# Patient Record
Sex: FEMALE | Race: WHITE
[De-identification: names, ages, dates, MRNs, and addresses within clinical notes are randomized per-mention and may not be internally consistent; named-entity substitution may affect disease eponyms.]

---

## 2017-07-29 ENCOUNTER — HEALTH MAINTENANCE LETTER (OUTPATIENT)
Age: 22
End: 2017-07-29

## 2019-06-11 ENCOUNTER — OFFICE VISIT (OUTPATIENT)
Dept: FAMILY MEDICINE | Facility: CLINIC | Age: 24
End: 2019-06-11
Payer: COMMERCIAL

## 2019-06-11 VITALS
TEMPERATURE: 99.3 F | WEIGHT: 227 LBS | DIASTOLIC BLOOD PRESSURE: 72 MMHG | OXYGEN SATURATION: 97 % | HEIGHT: 62 IN | BODY MASS INDEX: 41.77 KG/M2 | SYSTOLIC BLOOD PRESSURE: 113 MMHG | HEART RATE: 65 BPM

## 2019-06-11 DIAGNOSIS — Z00.00 HEALTHCARE MAINTENANCE: ICD-10-CM

## 2019-06-11 DIAGNOSIS — Z23 NEED FOR VACCINATION: Primary | ICD-10-CM

## 2019-06-11 PROCEDURE — 90471 IMMUNIZATION ADMIN: CPT | Performed by: NURSE PRACTITIONER

## 2019-06-11 PROCEDURE — 90714 TD VACC NO PRESV 7 YRS+ IM: CPT | Performed by: NURSE PRACTITIONER

## 2019-06-11 PROCEDURE — 99203 OFFICE O/P NEW LOW 30 MIN: CPT | Mod: 25 | Performed by: NURSE PRACTITIONER

## 2019-06-11 PROCEDURE — 86765 RUBEOLA ANTIBODY: CPT | Performed by: NURSE PRACTITIONER

## 2019-06-11 PROCEDURE — 86762 RUBELLA ANTIBODY: CPT | Performed by: NURSE PRACTITIONER

## 2019-06-11 PROCEDURE — 36415 COLL VENOUS BLD VENIPUNCTURE: CPT | Performed by: NURSE PRACTITIONER

## 2019-06-11 PROCEDURE — 86735 MUMPS ANTIBODY: CPT | Performed by: NURSE PRACTITIONER

## 2019-06-11 PROCEDURE — 86580 TB INTRADERMAL TEST: CPT | Performed by: NURSE PRACTITIONER

## 2019-06-11 RX ORDER — SERTRALINE HYDROCHLORIDE 100 MG/1
100 TABLET, FILM COATED ORAL DAILY
COMMUNITY

## 2019-06-11 ASSESSMENT — MIFFLIN-ST. JEOR: SCORE: 1737.92

## 2019-06-11 ASSESSMENT — PAIN SCALES - GENERAL: PAINLEVEL: NO PAIN (0)

## 2019-06-11 NOTE — LETTER
[facility]  [address] [place]  [address]  [phone]        June 13, 2019    Amaris Barnhart  [address]  [facility] [address]        Dear Amaris,    The results of your recent labs are enclosed.   Your blood work shows you have a positive antibody response to Rubella, Rubeola and Mumps through previous vaccination. No additional vaccination (booster) is needed.   Please call the clinic if you have any concerns.     Results for orders placed or performed in visit on 06/11/19   Rubeola Antibody IgG   Result Value Ref Range    Rubeola (Measles) Antibody IgG 2.8 (H) 0.0 - 0.8 AI   Mumps Antibody IgG   Result Value Ref Range    Mumps Antibody IgG 5.5 (H) 0.0 - 0.8 AI   Rubella Antibody IgG Quantitative   Result Value Ref Range    Rubella Antibody IgG Quantitative 10 IU/mL       If you have any questions please call the clinic at [phone].    Sincerely,    Mariam WEBB CNP  LMD

## 2019-06-11 NOTE — PROGRESS NOTES
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    We reviewed previous immunization history  Last Tdap 2007  She has completed vaccination series for Hep A, B, Menactra, HPV, Polio  She believes to have completed course of MMR but does not have actual immunization date  She was previously living in [place], and [place] prior that that    She is reviewing forms online and states that she needs immunization for Meningococcal B, pneumonia, 4th polio        Patient Active Problem List   Diagnosis     Obesity     Elevated serum creatinine     Past Surgical History:   Procedure Laterality Date     C ANESTH,CLEFT LIP REPAIR         Social History     Tobacco Use     Smoking status: Never Smoker     Smokeless tobacco: Never Used     Tobacco comment: no exposure in home   Substance Use Topics     Alcohol use: No     Family History   Problem Relation Age of Onset     Hypertension Maternal Grandfather      Heart Disease Maternal Grandfather      Hypertension Paternal Grandfather          Current Outpatient Medications   Medication Sig Dispense Refill     sertraline (ZOLOFT) 100 MG tablet Take 100 mg by mouth daily       acetaminophen (TYLENOL) 325 MG tablet Take 1-2 tablets by mouth every 6 hours as needed.           Reviewed and updated as needed this visit by Provider         Review of Systems   ROS COMP: Constitutional, HEENT, cardiovascular, pulmonary, gi and gu systems are negative, except as otherwise noted.      Objective    /72 (BP Location: Right arm, Patient Position: Chair, Cuff Size: Adult Large)   Pulse 65   Temp 99.3  F (37.4  C) (Oral)   Ht 1.575 m (5' 2\")   Wt 103 kg (227 lb)   SpO2 97%   BMI 41.52 kg/m    Body mass index " "is 41.52 kg/m .  Physical Exam   GENERAL: healthy, alert and no distress  PSYCH: mentation appears normal, affect normal/bright    Diagnostic Test Results:  None        Assessment & Plan       ICD-10-CM    1. Need for vaccination Z23 Rubeola Antibody IgG     Mumps Antibody IgG     Rubella Antibody IgG Quantitative     TB INTRADERMAL TEST     TD PRESERV FREE, IM (7+ YRS)     IPV, IM/SUBQ (6+ WKS)   2. Healthcare maintenance Z00.00 Rubeola Antibody IgG     Mumps Antibody IgG     Rubella Antibody IgG Quantitative     TB INTRADERMAL TEST     TD PRESERV FREE, IM (7+ YRS)     IPV, IM/SUBQ (6+ WKS)        BMI:   Estimated body mass index is 41.52 kg/m  as calculated from the following:    Height as of this encounter: 1.575 m (5' 2\").    Weight as of this encounter: 103 kg (227 lb).   Not discussed    Will check titers for MMR  Mantoux and tetanus booster   I am not sure why she would need a 4th Polio vaccine, pneumonia vaccination or Meningococcal B  I reviewed CDC's recommendations on these vaccinations and she is not high risk requiring any of these vaccinations  I recommend reviewing College's recommendations to determine whether these are necessary as in my opinion they are not      JON Jenkins [facility]      "

## 2019-06-11 NOTE — NURSING NOTE
The patient is asked the following questions today and these are her answers:    -Have you had a mantoux administered in the past 30 days?    No  -Have you had a previous positive Mantoux.  No  -Have you received BCG in the past.  No  -Have you had a live vaccine  (MMR, Varicella, OPV, Yellow Fever) in the last 6 weeks.  No  -Have you had and active  viral or bacterial infection in the past 6 weeks.  No  -Have you received corticosteroids or immunosuppressive agents in the past 6 weeks.  No  -Have you been diagnosed with HIV?  No  -Do you have a maglinancy?  No   Mantoux given to patient today    Time of administration 406 PM     Date of administration 6/11/19     Given in right forearm forearm.     Patient advised to return 48- 72 hours for reading.     Sign  YANN Grace MA    Screening Questionnaire for Adult Immunization    1. Are you sick today? No  2. Do  you have allergies to medications, food, a vaccine component, or latex? No   3. Have you had a serious reaction to a vaccine in the past? No   4. Do you have a long-term  health problem with lung, heart, kidney or metabolic disease(e.g., diabetes), asthma, or a blood disorder?  No  5.Do you have cancer,leukemia,HIV/AIDS, or any other immune system problem ? No  6. In the past 3 months, have you taken medications that weaken your immune sytem,      Such as cortisone, prednisone, other steroids, or anticancer drugs, or have you had radiation treatments ?No  7. Have you had a seizure or brain or other nervous system problems? No  8. During the past year, have you received a transfusion of blood or blood products, or been given immune      (gamma) globulin or an antiviral drug? No  9. For women: Are you pregnant or is there a chance you could become pregnant during the next month?No   10. Have you received any vaccinations in the past 4 weeks ? No    Immunization questionnaire answers were all negative.     Screening performed by Patient/CM  VIS for Td given on  same date of administration.  Staff signature/Title: YANN Grace MA  Prior to injection, verified patient identity using patient's name and date of birth.  Due to injection administration, patient instructed to remain in clinic for 15 minutes  afterwards, and to report any adverse reaction to me immediately.    Td    Drug Amount Wasted:  None.  Vial/Syringe: Syringe  Expiration Date:

## 2019-06-13 ENCOUNTER — ALLIED HEALTH/NURSE VISIT (OUTPATIENT)
Dept: NURSING | Facility: CLINIC | Age: 24
End: 2019-06-13
Payer: COMMERCIAL

## 2019-06-13 DIAGNOSIS — Z11.1 SCREENING EXAMINATION FOR PULMONARY TUBERCULOSIS: Primary | ICD-10-CM

## 2019-06-13 LAB
MEV IGG SER QL IA: 2.8 AI (ref 0–0.8)
MUV IGG SER QL IA: 5.5 AI (ref 0–0.8)
PPDINDURATION: 0 MM (ref 0–5)
PPDREDNESS: 0 MM
RUBV IGG SERPL IA-ACNC: 10 IU/ML

## 2019-06-13 PROCEDURE — 99207 ZZC NO CHARGE NURSE ONLY: CPT

## 2019-06-13 NOTE — NURSING NOTE
Mantoux result:  Lab Results   Component Value Date    PPDREDNESS 0 06/13/2019    PPDINDURATIO 0 06/13/2019     Is induration greater than 5mm?  No     Mantoux results: Negative  No induration.  No swelling.  No redness.    Nadja Castro RN  [facility]

## 2019-06-13 NOTE — RESULT ENCOUNTER NOTE
[address] [address]  Phone: [phone]  Fax: [phone]      06/13/19    Amaris Barnhart  [address] [place] [id number]      Dear Amaris,    The results of your recent labs are enclosed.  Your blood work shows you have a positive antibody response to Rubella, Rubeola and Mumps through previous vaccination. No additional vaccination (booster) is needed.   Please call the clinic if you have any concerns.    Sincerely,    JON Jenkins CNP    Your [facility] Care Team

## 2019-08-13 ENCOUNTER — TELEPHONE (OUTPATIENT)
Dept: FAMILY MEDICINE | Facility: CLINIC | Age: 24
End: 2019-08-13

## 2019-08-13 NOTE — TELEPHONE ENCOUNTER
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    Type of outreach:    See above message.    Questions for provider review:    None                                                                                                                                    YANN Grace MA       Chart routed to Care Team .

## 2020-05-06 ENCOUNTER — OFFICE VISIT (OUTPATIENT)
Dept: ORTHOPEDICS | Facility: CLINIC | Age: 25
End: 2020-05-06
Payer: COMMERCIAL

## 2020-05-06 ENCOUNTER — ANCILLARY PROCEDURE (OUTPATIENT)
Dept: GENERAL RADIOLOGY | Facility: CLINIC | Age: 25
End: 2020-05-06
Attending: FAMILY MEDICINE
Payer: COMMERCIAL

## 2020-05-06 VITALS — BODY MASS INDEX: 34.96 KG/M2 | HEIGHT: 62 IN | WEIGHT: 190 LBS

## 2020-05-06 DIAGNOSIS — M25.572 PAIN OF JOINT OF LEFT ANKLE AND FOOT: ICD-10-CM

## 2020-05-06 DIAGNOSIS — M25.572 PAIN OF JOINT OF LEFT ANKLE AND FOOT: Primary | ICD-10-CM

## 2020-05-06 ASSESSMENT — MIFFLIN-ST. JEOR: SCORE: 1565.08

## 2020-05-06 NOTE — LETTER
5/6/2020       RE: Amaris Barnhart  [address] [facility] [address]     Dear Colleague,    Thank you for referring your patient, Amaris Barnhart, to the [facility]THOP[facility] WALK [facility] CLINIC at [facility]. Please see a copy of my visit note below.          SPORTS & ORTHOPEDIC WALK-IN VISIT 5/6/2020    Primary Care Physician: DrBubba     She was running and stepped off the curb wrong and fell today. The majority of her pain is on the lateral ankle going down into the foot and up into the shin. She is unable to bear weight on it. She has a history of ankle sprains on her left ankle.     Reason for visit:     What part of your body is injured / painful?  left ankle    What caused the injury /pain? Fall    How long ago did your injury occur or pain begin? today    What are your most bothersome symptoms? Pain, Swelling and Numbness    How would you characterize your symptom?  dull, sharp and throbing    What makes your symptoms better? Ice and Ibuprofen    What makes your symptoms worse? Standing, Walking and Movement    Have you been previously seen for this problem? No    Medical History:    Any recent changes to your medical history? No    Any new medication prescribed since last visit? No    Have you had surgery on this body part before? No    Social History:    Occupation: Student    Handedness: Right    Exercise: 4-5 days/week    Review of Systems:    Do you have fever, chills, weight loss? No    Do you have any vision problems? No    Do you have any chest pain or edema? No    Do you have any shortness of breath or wheezing?  No    Do you have stomach problems? Yes, GI     Do you have any numbness or focal weakness? No    Do you have diabetes? No    Do you have problems with bleeding or clotting? No    Do you have an rashes or other skin lesions? No         DME FITTING    Relevant Diagnosis: Left ankle sprain  Aircast and crutches were fit on patient's Left ankle.  "    Person(s) involved in teaching:   Patient    Brace was applied in standard Manner:  Yes  Brace fit well:  Yes  Patient reports brace to fit comfortably:  Yes    Education:   Patient shown self application and removal of brace: Yes  Patient shown how to adjust brace fit, if necessary: Yes  Patient educated on billing and return policy: Yes  Patient confirmed understanding when and how to contact clinic with concerns: Yes      SUBJECTIVE: Amaris Barnhart is a 24 year old female who was running and she stepped off a curb wrong. About 10:30 a.m.  Doesn't have crutches at home. Thinks the ankle might have gone up and over.  Pt reports she's sprained her left ankle this bad in the past just once.  Unable to move it in any direction, not able to bear weight.  Pt in wheel chair.  Iced, 2 ibuprofen taken.    PAST MEDICAL, SOCIAL, SURGICAL AND FAMILY HISTORY: She  has a past medical history of Elevated serum creatinine and Urinary tract infection, site not specified.  She  has a past surgical history that includes ANESTH,CLEFT LIP REPAIR.  Her family history includes Heart Disease in her maternal grandfather; Hypertension in her maternal grandfather and paternal grandfather.  She reports that she has never smoked. She has never used smokeless tobacco. She reports that she does not drink alcohol or use drugs.      ALLERGIES: She has No Known Allergies.    CURRENT MEDICATIONS: She has a current medication list which includes the following prescription(s): acetaminophen and sertraline.     REVIEW OF SYSTEMS: 10 point review of systems is negative except as noted above.    EXAM:  Ht 1.575 m (5' 2\")   Wt 86.2 kg (190 lb)   BMI 34.75 kg/m    CONSTITUTIONIAL: alert, moderate distress and cooperative  HEAD: Normocephalic. No masses, lesions, tenderness or abnormalities  ENT: ENT exam normal, no neck nodes or sinus tenderness  SKIN: no suspicious lesions or rashes  GAIT: normal  Stance: normal  NEUROLOGIC: Normal muscle tone and " strength, reflexes normal, sensation grossly normal.  PSYCHIATRIC: affect normal/bright and mentation appears normal.    MUSCULOSKELETAL: left ankle pain    ANKLE  Inspection/Palpation:     Swelling: ++ lateral swelling   Tender: ATFL, CFL, PTFL, mild deltoid ligament tenderness, many areas are painful including up left tibialis anterior     Non-tender: medial malleolus, anterior tib-fib ligament, achilles  tendon, no achilles tendon defect   Range of Motion: dorsiflexion: full, plantarflexion: full, inversion: full, eversion: full  Strength:dorsiflexion: 4/5, plantarflexion: 4/5, inversion: 4/5, eversion: 5-/5   Special tests: positive anterior drawer, negative varus stress, negative valgus stress, positive forced external rotation, negative Nj sign     FOOT  Inspection/Palpation:      Swelling: no swelling     Non-tender: promixal 5th metatarsal, 1st, 2nd, 3rd, 4th, 5th metatarsals, calcaneous, cuboid,  navicular, cuneiform lateral, cuneiform middle, cuneiform medial, metatarsal heads, peroneal tendon: at lateral malleolus, at cuboid, at proximal 5th metatarsal, posterior tibial tendon at medial malleolus, posterior tibial tendon at navicular, plantar fascia  Range of Motion: flexion of toes: full, extension of toes: full        ASSESSMENT/PLAN:  Pt is a 23 yo white female with PMhx of left ankle sprain presenting with left ankle injury  1. Left ankle injury- concerning for lateral ligament injury  Ice, compression, ROM  Crutches- partial weight bearing to full  Balance and proprioception exercises    Formal PT if not improving    X-RAY INTERPRETATION:   X-Ray of the Left Ankle: 3-view, ap, lateral, oblique   ordered and interpreted in the office today was negative for fracture, subluxation or joint space abnormality.    Again, thank you for allowing me to participate in the care of your patient.      Sincerely,    Nancy Kennedy MD

## 2020-05-06 NOTE — PROGRESS NOTES
"SUBJECTIVE: Amaris Barnhart is a 24 year old female who was running and she stepped off a curb wrong. About 10:30 a.m.  Doesn't have crutches at home. Thinks the ankle might have gone up and over.  Pt reports she's sprained her left ankle this bad in the past just once.  Unable to move it in any direction, not able to bear weight.  Pt in wheel chair.  Iced, 2 ibuprofen taken.    PAST MEDICAL, SOCIAL, SURGICAL AND FAMILY HISTORY: She  has a past medical history of Elevated serum creatinine and Urinary tract infection, site not specified.  She  has a past surgical history that includes ANESTH,CLEFT LIP REPAIR.  Her family history includes Heart Disease in her maternal grandfather; Hypertension in her maternal grandfather and paternal grandfather.  She reports that she has never smoked. She has never used smokeless tobacco. She reports that she does not drink alcohol or use drugs.      ALLERGIES: She has No Known Allergies.    CURRENT MEDICATIONS: She has a current medication list which includes the following prescription(s): acetaminophen and sertraline.     REVIEW OF SYSTEMS: 10 point review of systems is negative except as noted above.    EXAM:  Ht 1.575 m (5' 2\")   Wt 86.2 kg (190 lb)   BMI 34.75 kg/m    CONSTITUTIONIAL: alert, moderate distress and cooperative  HEAD: Normocephalic. No masses, lesions, tenderness or abnormalities  ENT: ENT exam normal, no neck nodes or sinus tenderness  SKIN: no suspicious lesions or rashes  GAIT: normal  Stance: normal  NEUROLOGIC: Normal muscle tone and strength, reflexes normal, sensation grossly normal.  PSYCHIATRIC: affect normal/bright and mentation appears normal.    MUSCULOSKELETAL: left ankle pain    ANKLE  Inspection/Palpation:     Swelling: ++ lateral swelling   Tender: ATFL, CFL, PTFL, mild deltoid ligament tenderness, many areas are painful including up left tibialis anterior     Non-tender: medial malleolus, anterior tib-fib ligament, achilles  tendon, no achilles " tendon defect   Range of Motion: dorsiflexion: full, plantarflexion: full, inversion: full, eversion: full  Strength:dorsiflexion: 4/5, plantarflexion: 4/5, inversion: 4/5, eversion: 5-/5   Special tests: positive anterior drawer, negative varus stress, negative valgus stress, positive forced external rotation, negative Nj sign     FOOT  Inspection/Palpation:      Swelling: no swelling     Non-tender: promixal 5th metatarsal, 1st, 2nd, 3rd, 4th, 5th metatarsals, calcaneous, cuboid,  navicular, cuneiform lateral, cuneiform middle, cuneiform medial, metatarsal heads, peroneal tendon: at lateral malleolus, at cuboid, at proximal 5th metatarsal, posterior tibial tendon at medial malleolus, posterior tibial tendon at navicular, plantar fascia  Range of Motion: flexion of toes: full, extension of toes: full        ASSESSMENT/PLAN:  Pt is a 25 yo white female with PMhx of left ankle sprain presenting with left ankle injury  1. Left ankle injury- concerning for lateral ligament injury  Ice, compression, ROM  Crutches- partial weight bearing to full  Balance and proprioception exercises    Formal PT if not improving    X-RAY INTERPRETATION:   X-Ray of the Left Ankle: 3-view, ap, lateral, oblique   ordered and interpreted in the office today was negative for fracture, subluxation or joint space abnormality.

## 2020-05-06 NOTE — PROGRESS NOTES
SPORTS & ORTHOPEDIC WALK-IN VISIT 5/6/2020    Primary Care Physician:      She was running and stepped off the curb wrong and fell today. The majority of her pain is on the lateral ankle going down into the foot and up into the shin. She is unable to bear weight on it. She has a history of ankle sprains on her left ankle.     Reason for visit:     What part of your body is injured / painful?  left ankle    What caused the injury /pain? Fall    How long ago did your injury occur or pain begin? today    What are your most bothersome symptoms? Pain, Swelling and Numbness    How would you characterize your symptom?  dull, sharp and throbing    What makes your symptoms better? Ice and Ibuprofen    What makes your symptoms worse? Standing, Walking and Movement    Have you been previously seen for this problem? No    Medical History:    Any recent changes to your medical history? No    Any new medication prescribed since last visit? No    Have you had surgery on this body part before? No    Social History:    Occupation: Student    Handedness: Right    Exercise: 4-5 days/week    Review of Systems:    Do you have fever, chills, weight loss? No    Do you have any vision problems? No    Do you have any chest pain or edema? No    Do you have any shortness of breath or wheezing?  No    Do you have stomach problems? Yes, GI     Do you have any numbness or focal weakness? No    Do you have diabetes? No    Do you have problems with bleeding or clotting? No    Do you have an rashes or other skin lesions? No         DME FITTING    Relevant Diagnosis: Left ankle sprain  Aircast and crutches were fit on patient's Left ankle.     Person(s) involved in teaching:   Patient    Brace was applied in standard Manner:  Yes  Brace fit well:  Yes  Patient reports brace to fit comfortably:  Yes    Education:   Patient shown self application and removal of brace: Yes  Patient shown how to adjust brace fit, if necessary: Yes  Patient  educated on billing and return policy: Yes  Patient confirmed understanding when and how to contact clinic with concerns: Yes

## 2020-07-07 NOTE — TELEPHONE ENCOUNTER
RECORDS RECEIVED FROM: Self referral    DATE RECEIVED: 8/5/20   NOTES STATUS DETAILS   OFFICE NOTE from referring provider N/A Self referral         [facility] LABS Internal      REFERRAL INFORMATION    Date referral was placed: 8/5/20   Date all records received:    Date records were scanned into EPIC:    Date records were sent to Provider to review:    Date and recommendation received from provider:  LETTER SENT  SCHEDULE APPOINTMENT   Date patient was contacted to schedule: 7/6/20

## 2020-08-05 ENCOUNTER — PRE VISIT (OUTPATIENT)
Dept: GASTROENTEROLOGY | Facility: CLINIC | Age: 25
End: 2020-08-05

## 2020-08-05 ENCOUNTER — VIRTUAL VISIT (OUTPATIENT)
Dept: GASTROENTEROLOGY | Facility: CLINIC | Age: 25
End: 2020-08-05
Payer: COMMERCIAL

## 2020-08-05 VITALS — HEIGHT: 62 IN | BODY MASS INDEX: 34.96 KG/M2 | WEIGHT: 190 LBS

## 2020-08-05 DIAGNOSIS — R10.84 ABDOMINAL PAIN, GENERALIZED: ICD-10-CM

## 2020-08-05 DIAGNOSIS — R10.11 ABDOMINAL PAIN, RIGHT UPPER QUADRANT: Primary | ICD-10-CM

## 2020-08-05 SDOH — HEALTH STABILITY: MENTAL HEALTH: HOW OFTEN DO YOU HAVE 6 OR MORE DRINKS ON ONE OCCASION?: WEEKLY

## 2020-08-05 SDOH — HEALTH STABILITY: MENTAL HEALTH: HOW OFTEN DO YOU HAVE A DRINK CONTAINING ALCOHOL?: 2-3 TIMES A WEEK

## 2020-08-05 SDOH — HEALTH STABILITY: MENTAL HEALTH: HOW MANY STANDARD DRINKS CONTAINING ALCOHOL DO YOU HAVE ON A TYPICAL DAY?: 1 OR 2

## 2020-08-05 ASSESSMENT — PAIN SCALES - GENERAL: PAINLEVEL: NO PAIN (0)

## 2020-08-05 ASSESSMENT — MIFFLIN-ST. JEOR: SCORE: 1560.08

## 2020-08-05 NOTE — PROGRESS NOTES
"Amaris Barnhart is a 25 year old female who is being evaluated via a billable video visit.      The patient has been notified of following:     \"This video visit will be conducted via a call between you and your physician/provider. We have found that certain health care needs can be provided without the need for an in-person physical exam.  This service lets us provide the care you need with a video conversation.  If a prescription is necessary we can send it directly to your pharmacy.  If lab work is needed we can place an order for that and you can then stop by our lab to have the test done at a later time.    Video visits are billed at different rates depending on your insurance coverage.  Please reach out to your insurance provider with any questions.    If during the course of the call the physician/provider feels a video visit is not appropriate, you will not be charged for this service.\"    Patient has given verbal consent for Video visit? Yes  How would you like to obtain your AVS? MyChart  If you are dropped from the video visit, the video invite should be resent to: Send to e-mail at: [email or web address]  Will anyone else be joining your video visit? No    During this virtual visit the patient is located in [place], patient verifies this as the location during the entirety of this visit.       Video-Visit Details    Type of service:  Video Visit    Video Start Time: 8:15  Video End Time: 9:00    Originating Location (pt. Location): Home    Distant Location (provider location):  [facility]     Platform used for Video Visit: Bakari Burton MD            GASTROENTEROLOGY NEW PATIENT VIDEO VISIT    CC/REFERRING MD:    Clinic, [facility]  Referred Self    REASON FOR CONSULTATION:   Referred Self for   Chief Complaint   Patient presents with     Consult     Abdominal pain       HISTORY OF PRESENT ILLNESS:    Amaris Barnhart is a 25 year old female who is " being evaluated via a billable video visit.        Oct 2019 after a visit to [place] had an episode of vomiting that spontaneously resolved.    March 2020 recurrent symptoms began.  4-5 episodes from March-May.  No pattern in terms of food identifiable.  Perhaps if eats too late, or if lies down right after eating.  For example, last night had a huge meal at [employer] for her [employer] party and she did well, no sx.  June/July: flares about once every 2 weeks.  But, as of today, last episode was about 3 weeks ago.  She recently started taking a probiotic vitamin supplement from [facility] and she thinks this is helping.    Overall denies travel or sick contacts.  She attends grad school in [place] and symptoms started shortly after arriving back to [place] early March.  Has been in [place] since 2nd week of March except:  3rd week March went back to [place] 1.5 days to get her cat  May traveled from [place] to [facility]. to move    In general sx are: lower back pain, has to get up to walk around, then the pain moves to her rib cage radiates from the back to front, cannot lie down or put pressure on it - walks around - cannot sit - tries to vomit or have a BM but cannot.  Feels better if burps.  Finally falls asleep but not because the pain is gone - falls asleep with the pain.  In the morning: feels sore in her abdomen.      Meds: takes Advil for headaches. Sometimes tylenol. Has been on Zoloft since mid-2017 this is not a new med.    No diet changes this year.  BMs: no blood. Normal but feels constipated with probiotics which have been overall improving her symptoms so the constipation is not contributing.    In between attacks: feels a side ache in her right rib cage, ignores it.  No nausea in between attacks.          I have reviewed and updated the patient's Past Medical History, Social History, Family History and Medication List.    PERTINENT PAST MEDICAL HISTORY:  (I personally reviewed this history with  the patient at today's visit)   Past Medical History:   Diagnosis Date     Elevated serum creatinine      Urinary tract infection, site not specified     x 2         PREVIOUS SURGERIES: (I personally reviewed this history with the patient at today's visit)   Past Surgical History:   Procedure Laterality Date     C ANESTH,CLEFT LIP REPAIR         ALLERGIES:   No Known Allergies    PERTINENT MEDICATIONS:    Current Outpatient Medications:      acetaminophen (TYLENOL) 325 MG tablet, Take 1-2 tablets by mouth every 6 hours as needed., Disp: , Rfl:      sertraline (ZOLOFT) 100 MG tablet, Take 100 mg by mouth daily, Disp: , Rfl:     SOCIAL HISTORY:  Social History     Socioeconomic History     Marital status: Single     Spouse name: Not on file     Number of children: Not on file     Years of education: Not on file     Highest education level: Not on file   Occupational History     Not on file   Social Needs     Financial resource strain: Not on file     Food insecurity     Worry: Not on file     Inability: Not on file     Transportation needs     Medical: Not on file     Non-medical: Not on file   Tobacco Use     Smoking status: Never Smoker     Smokeless tobacco: Never Used     Tobacco comment: no exposure in home   Substance and Sexual Activity     Alcohol use: Yes     Alcohol/week: 1.0 - 3.0 standard drinks     Types: 1 - 3 Cans of beer per week     Frequency: 2-3 times a week     Drinks per session: 1 or 2     Binge frequency: Weekly     Drug use: No     Sexual activity: Never   Lifestyle     Physical activity     Days per week: Not on file     Minutes per session: Not on file     Stress: Not on file   Relationships     Social connections     Talks on phone: Not on file     Gets together: Not on file     Attends Mormon service: Not on file     Active member of club or organization: Not on file     Attends meetings of clubs or organizations: Not on file     Relationship status: Not on file     Intimate partner  violence     Fear of current or ex partner: Not on file     Emotionally abused: Not on file     Physically abused: Not on file     Forced sexual activity: Not on file   Other Topics Concern      Service Not Asked     Blood Transfusions No     Caffeine Concern Not Asked     Occupational Exposure Not Asked     Hobby Hazards Not Asked     Sleep Concern Not Asked     Stress Concern Not Asked     Weight Concern Not Asked     Special Diet Not Asked     Back Care Not Asked     Exercise Not Asked     Bike Helmet Not Asked     Seat Belt Not Asked     Self-Exams Not Asked   Social History Narrative     Not on file       FAMILY HISTORY:   Family History   Problem Relation Age of Onset     Hypertension Maternal Grandfather      Heart Disease Maternal Grandfather      Hypertension Paternal Grandfather     does not have children.  No tobacco. Etoh: 2u/week. Is now completing a virtual internship.    Gallbladder dz: aunt age 50s and cousin age 25.  Mother: IBS  No IBD no celiac no pancreatic d/o or ca    Review of Systems  See HPI and:  No heartburn no dysphagia no unintentional weight loss  + ear infection  Sprained ankle in May - feels ok now    Exam:    General appearance:  Healthy appearing adult, in no acute distress  Ears, nose, mouth and throat:   Hearing intact  Neck:  Symmetric  Respiratory:  Normal respiration, no use of accessory muscles   MSK:  No visual upper extremity, neck or facial muscle atrophy  Skin:  No rashes or jaundice   Psychiatric:  Oriented to person, place and time, Appropriate mood and affect.   Neurologic:  Peripheral muscle function and dexterity appear to be intact      PERTINENT STUDIES have been reviewed.  Liver tests wnl per patient    Impression/Recommendations:    Amaris Barnhart is a 25 year old female who presents for evaluation of flares of lower abdominal and back pain radiating up to her ribcage every few weeks in the evening.  Ddx includes biliary etiology vs PUD vs small bowel  pathology vs SIBO vs other.  Labs cbc, tsh, liver panel and chem7, celiac, lipase/amylase  RUQ US  We discussed CT abdo/pelvis and she would like to hold off on this for now  She has not had a flare of symptoms in 3 weeks.  I would like for her to let us know if she has another          Julia Burton MD    RTC 3 months    Thank you for this consultation.  It was a pleasure to participate in the care of this patient; please contact us with any further questions.      Time spent in appointment today was 45 minutes.

## 2020-08-05 NOTE — LETTER
8/5/2020         RE: Amaris Barnhart  [address]  [facility] [address]        Dear Colleague,    Thank you for referring your patient, Amaris Barnhart, to the [facility] GASTROENTEROLOGY [facility] IBD CLINIC. Please see a copy of my visit note below.    Amaris Barnhart is a 25 year old female who is being evaluated via a billable video visit.        During this virtual visit the patient is located in [place], patient verifies this as the location during the entirety of this visit.       Video-Visit Details    Type of service:  Video Visit    Video Start Time: 8:15  Video End Time: 9:00    Originating Location (pt. Location): Home    Distant Location (provider location):  [facility]     Platform used for Video Visit: Bakari Burton MD            GASTROENTEROLOGY NEW PATIENT VIDEO VISIT    CC/REFERRING MD:    Clinic, [facility]  Referred Self    REASON FOR CONSULTATION:   Referred Self for   Chief Complaint   Patient presents with     Consult     Abdominal pain       HISTORY OF PRESENT ILLNESS:    Amaris Barnhart is a 25 year old female who is being evaluated via a billable video visit.        Oct 2019 after a visit to [place] had an episode of vomiting that spontaneously resolved.    March 2020 recurrent symptoms began.  4-5 episodes from March-May.  No pattern in terms of food identifiable.  Perhaps if eats too late, or if lies down right after eating.  For example, last night had a huge meal at [employer] for her [employer]ay party and she did well, no sx.  June/July: flares about once every 2 weeks.  But, as of today, last episode was about 3 weeks ago.  She recently started taking a probiotic vitamin supplement from [facility] and she thinks this is helping.    Overall denies travel or sick contacts.  She attends grad school in [place] and symptoms started shortly after arriving back to [place] early March.  Has been in [place] since 2nd week of March except:  3rd week  March went back to [place] 1.5 days to get her cat  May traveled from [place] to [facility] to move    In general sx are: lower back pain, has to get up to walk around, then the pain moves to her rib cage radiates from the back to front, cannot lie down or put pressure on it - walks around - cannot sit - tries to vomit or have a BM but cannot.  Feels better if burps.  Finally falls asleep but not because the pain is gone - falls asleep with the pain.  In the morning: feels sore in her abdomen.      Meds: takes Advil for headaches. Sometimes tylenol. Has been on Zoloft since mid-2017 this is not a new med.    No diet changes this year.  BMs: no blood. Normal but feels constipated with probiotics which have been overall improving her symptoms so the constipation is not contributing.    In between attacks: feels a side ache in her right rib cage, ignores it.  No nausea in between attacks.          I have reviewed and updated the patient's Past Medical History, Social History, Family History and Medication List.    PERTINENT PAST MEDICAL HISTORY:  (I personally reviewed this history with the patient at today's visit)   Past Medical History:   Diagnosis Date     Elevated serum creatinine      Urinary tract infection, site not specified     x 2         PREVIOUS SURGERIES: (I personally reviewed this history with the patient at today's visit)   Past Surgical History:   Procedure Laterality Date     C ANESTH,CLEFT LIP REPAIR         ALLERGIES:   No Known Allergies    PERTINENT MEDICATIONS:    Current Outpatient Medications:      acetaminophen (TYLENOL) 325 MG tablet, Take 1-2 tablets by mouth every 6 hours as needed., Disp: , Rfl:      sertraline (ZOLOFT) 100 MG tablet, Take 100 mg by mouth daily, Disp: , Rfl:     SOCIAL HISTORY:  Social History     Socioeconomic History     Marital status: Single     Spouse name: Not on file     Number of children: Not on file     Years of education: Not on file     Highest  education level: Not on file   Occupational History     Not on file   Social Needs     Financial resource strain: Not on file     Food insecurity     Worry: Not on file     Inability: Not on file     Transportation needs     Medical: Not on file     Non-medical: Not on file   Tobacco Use     Smoking status: Never Smoker     Smokeless tobacco: Never Used     Tobacco comment: no exposure in home   Substance and Sexual Activity     Alcohol use: Yes     Alcohol/week: 1.0 - 3.0 standard drinks     Types: 1 - 3 Cans of beer per week     Frequency: 2-3 times a week     Drinks per session: 1 or 2     Binge frequency: Weekly     Drug use: No     Sexual activity: Never   Lifestyle     Physical activity     Days per week: Not on file     Minutes per session: Not on file     Stress: Not on file   Relationships     Social connections     Talks on phone: Not on file     Gets together: Not on file     Attends Gnosticism service: Not on file     Active member of club or organization: Not on file     Attends meetings of clubs or organizations: Not on file     Relationship status: Not on file     Intimate partner violence     Fear of current or ex partner: Not on file     Emotionally abused: Not on file     Physically abused: Not on file     Forced sexual activity: Not on file   Other Topics Concern      Service Not Asked     Blood Transfusions No     Caffeine Concern Not Asked     Occupational Exposure Not Asked     Hobby Hazards Not Asked     Sleep Concern Not Asked     Stress Concern Not Asked     Weight Concern Not Asked     Special Diet Not Asked     Back Care Not Asked     Exercise Not Asked     Bike Helmet Not Asked     Seat Belt Not Asked     Self-Exams Not Asked   Social History Narrative     Not on file       FAMILY HISTORY:   Family History   Problem Relation Age of Onset     Hypertension Maternal Grandfather      Heart Disease Maternal Grandfather      Hypertension Paternal Grandfather     does not have children.   No tobacco. Etoh: 2u/week. Is now completing a virtual internship.    Gallbladder dz: aunt age 50s and cousin age 25.  Mother: IBS  No IBD no celiac no pancreatic d/o or ca    Review of Systems  See HPI and:  No heartburn no dysphagia no unintentional weight loss  + ear infection  Sprained ankle in May - feels ok now    Exam:    General appearance:  Healthy appearing adult, in no acute distress  Ears, nose, mouth and throat:   Hearing intact  Neck:  Symmetric  Respiratory:  Normal respiration, no use of accessory muscles   MSK:  No visual upper extremity, neck or facial muscle atrophy  Skin:  No rashes or jaundice   Psychiatric:  Oriented to person, place and time, Appropriate mood and affect.   Neurologic:  Peripheral muscle function and dexterity appear to be intact      PERTINENT STUDIES have been reviewed.  Liver tests wnl per patient    Impression/Recommendations:    Amaris Barnhart is a 25 year old female who presents for evaluation of flares of lower abdominal and back pain radiating up to her ribcage every few weeks in the evening.  Ddx includes biliary etiology vs PUD vs small bowel pathology vs SIBO vs other.  Labs cbc, tsh, liver panel and chem7, celiac, lipase/amylase  RUQ US  We discussed CT abdo/pelvis and she would like to hold off on this for now  She has not had a flare of symptoms in 3 weeks.  I would like for her to let us know if she has another          Julia Burton MD    RTC 3 months    Thank you for this consultation.  It was a pleasure to participate in the care of this patient; please contact us with any further questions.      Time spent in appointment today was 45 minutes.

## 2020-08-05 NOTE — NURSING NOTE
"Chief Complaint   Patient presents with     Consult     Abdominal pain       Vitals:    08/05/20 0727   Weight: 86.2 kg (190 lb)   Height: 1.575 m (5' 2\")       Body mass index is 34.75 kg/m .      Sonal Lacey LPN                          "

## 2020-11-22 ENCOUNTER — HEALTH MAINTENANCE LETTER (OUTPATIENT)
Age: 25
End: 2020-11-22

## 2021-09-19 ENCOUNTER — HEALTH MAINTENANCE LETTER (OUTPATIENT)
Age: 26
End: 2021-09-19

## 2022-01-08 ENCOUNTER — HEALTH MAINTENANCE LETTER (OUTPATIENT)
Age: 27
End: 2022-01-08

## 2022-11-20 ENCOUNTER — HEALTH MAINTENANCE LETTER (OUTPATIENT)
Age: 27
End: 2022-11-20

## 2023-04-15 ENCOUNTER — HEALTH MAINTENANCE LETTER (OUTPATIENT)
Age: 28
End: 2023-04-15